# Patient Record
Sex: FEMALE | Race: WHITE | NOT HISPANIC OR LATINO | Employment: FULL TIME | URBAN - METROPOLITAN AREA
[De-identification: names, ages, dates, MRNs, and addresses within clinical notes are randomized per-mention and may not be internally consistent; named-entity substitution may affect disease eponyms.]

---

## 2024-07-29 ENCOUNTER — TELEPHONE (OUTPATIENT)
Dept: OBSTETRICS AND GYNECOLOGY | Facility: CLINIC | Age: 64
End: 2024-07-29
Payer: COMMERCIAL

## 2024-07-29 DIAGNOSIS — Z12.31 ENCOUNTER FOR SCREENING MAMMOGRAM FOR MALIGNANT NEOPLASM OF BREAST: Primary | ICD-10-CM

## 2024-07-29 DIAGNOSIS — N95.1 SYMPTOMATIC MENOPAUSAL OR FEMALE CLIMACTERIC STATES: ICD-10-CM

## 2024-07-29 NOTE — TELEPHONE ENCOUNTER
Caller: Libra ESPINOSA    Relationship: Self    Best call back number: [phone]    What orders are you requesting (i.e. lab or imaging): IMAGING- YEARLY MAMMOGRAM AND DEXA SCAN    In what timeframe would the patient need to come in: PT IS SCHEDULED AS NEW GYN ON 12/2/2024    Where will you receive your lab/imaging services: - [facility]    Additional notes: PT IS SCHEDULED FOR NEW GYN APPT 12/2/2024. ASKING TO SCHEDULE FOR YEARLY MAMMOGRAM AND DEXA SCAN

## 2024-07-29 NOTE — TELEPHONE ENCOUNTER
Orders Placed This Encounter   Procedures    Mammo Screening Digital Tomosynthesis Bilateral With CAD     Standing Status:   Future     Standing Expiration Date:   7/29/2025     Order Specific Question:   Reason for Exam:     Answer:   due for screening mammogram     Order Specific Question:   Release to patient     Answer:   Routine Release [[id number]]     Screening mammogram ordered.  Is there a reason she is getting bone density scans done prior to 65?    Thanks, Toña Hamilton MD

## 2024-07-29 NOTE — TELEPHONE ENCOUNTER
Called and spoke with patient and informed her of both orders being placed.  She also will call her external facility ([facility]) and request for those records to be faxed over - fax number provided.

## 2024-07-29 NOTE — TELEPHONE ENCOUNTER
Called and spoke with patient.  She has been informed of mammogram order being placed.  Patient states that she had previously been followed for dexa scans because she has had two in the past that have shown osteopenia.  She states her last one was ~ 3 years ago.

## 2024-07-29 NOTE — TELEPHONE ENCOUNTER
Orders Placed This Encounter   Procedures    Mammo Screening Digital Tomosynthesis Bilateral With CAD     Standing Status:   Future     Standing Expiration Date:   7/29/2025     Order Specific Question:   Reason for Exam:     Answer:   due for screening mammogram     Order Specific Question:   Release to patient     Answer:   Routine Release [[id number]]    DEXA Bone Density Axial     Standing Status:   Future     Standing Expiration Date:   7/29/2025     Order Specific Question:   Reason for Exam:     Answer:   Post-menopausal     Order Specific Question:   Release to patient     Answer:   Routine Release [[id number]]     Order Specific Question:   Is patient taking or have taken long term Glucocorticoid (steroids)?     Answer:   No     Order Specific Question:   Does the patient have rheumatoid arthritis?     Answer:   No     Order Specific Question:   Does the patient have secondary osteoporosis?     Answer:   No     DEXA ordered.   Is there a way to get the records from the prior DEXA/bone density exams?    Thanks, Toña Hamilton MD

## 2024-08-08 PROBLEM — Z01.419 WELL WOMAN EXAM: Status: ACTIVE | Noted: 2024-08-08

## 2024-10-01 ENCOUNTER — LAB REQUISITION (OUTPATIENT)
Dept: LAB | Facility: HOSPITAL | Age: 64
End: 2024-10-01
Payer: COMMERCIAL

## 2024-10-01 DIAGNOSIS — D17.1 BENIGN LIPOMATOUS NEOPLASM OF SKIN AND SUBCUTANEOUS TISSUE OF TRUNK: ICD-10-CM

## 2024-10-01 PROCEDURE — 88304 TISSUE EXAM BY PATHOLOGIST: CPT | Performed by: SURGERY

## 2024-10-02 LAB
CYTO UR: NORMAL
LAB AP CASE REPORT: NORMAL
LAB AP CLINICAL INFORMATION: NORMAL
PATH REPORT.FINAL DX SPEC: NORMAL
PATH REPORT.GROSS SPEC: NORMAL

## 2024-10-08 LAB
NCCN CRITERIA FLAG: ABNORMAL
TYRER CUZICK SCORE: 13.1

## 2024-10-16 ENCOUNTER — OFFICE VISIT (OUTPATIENT)
Dept: OBSTETRICS AND GYNECOLOGY | Facility: CLINIC | Age: 64
End: 2024-10-16
Payer: COMMERCIAL

## 2024-10-16 VITALS — DIASTOLIC BLOOD PRESSURE: 80 MMHG | RESPIRATION RATE: 14 BRPM | WEIGHT: 130 LBS | SYSTOLIC BLOOD PRESSURE: 128 MMHG

## 2024-10-16 DIAGNOSIS — Z90.722 STATUS POST BILATERAL OOPHORECTOMY: ICD-10-CM

## 2024-10-16 DIAGNOSIS — Z01.419 ENCOUNTER FOR WELL WOMAN EXAM WITH ROUTINE GYNECOLOGICAL EXAM: Primary | ICD-10-CM

## 2024-10-16 DIAGNOSIS — Z80.3 FAMILY HISTORY OF BREAST CANCER: ICD-10-CM

## 2024-10-16 DIAGNOSIS — N39.3 SUI (STRESS URINARY INCONTINENCE, FEMALE): ICD-10-CM

## 2024-10-16 RX ORDER — UBIDECARENONE 100 MG
100 CAPSULE ORAL DAILY
COMMUNITY

## 2024-10-16 RX ORDER — DIPHENOXYLATE HYDROCHLORIDE AND ATROPINE SULFATE 2.5; .025 MG/1; MG/1
TABLET ORAL DAILY
COMMUNITY

## 2024-10-16 NOTE — PROGRESS NOTES
"Subjective   Chief Complaint   Patient presents with    Gynecologic Exam     Libra Warren is a 64 y.o. year old  menopausal female presenting to be seen for her annual exam.  This past year she has not been on hormone replacement therapy.  There has not been vaginal bleeding in the last 12 months.  Menopausal symptoms are present (hot flashes) but not affecting her quality of life .  Menopause was around 49 years of age.   She took a \"natural\" hormone medicine around .    SEXUAL Hx:  She is currently sexually active.  In the past year there there has been NO new sexual partners.    Condoms are never used.  She would not like to be screened for STD's at today's exam.  Larke is painful: no  HEALTH Hx:  She exercises regularly: yes - pilates, walking (2.5 -3 miles).  She wears her seat belt: yes.  She has concerns about domestic violence: no.  OTHER THINGS SHE WANTS TO DISCUSS TODAY:  Nothing else    The following portions of the patient's history were reviewed and updated as appropriate:problem list, current medications, allergies, past family history, past medical history, past social history, and past surgical history.    Social History    Tobacco Use      Smoking status: Never      Smokeless tobacco: Never      Review of Systems  Constitutional POS: nothing reported    NEG: anorexia or night sweats   Genitourinary POS: EDEL is present but it IS NOT effecting her ADL's    NEG: dysuria or hematuria      Gastointestinal POS: nothing reported    NEG: bloating, change in bowel habits, melena, or reflux symptoms   Integument POS: nothing reported    NEG: moles that are changing in size, shape, color or rashes   Breast POS: nothing reported    NEG: persistent breast lump, skin dimpling, or nipple discharge        Objective   /80   Resp 14   Wt 59 kg (130 lb)     General:  well developed; well nourished  no acute distress  mentation appropriate   Skin:  No suspicious lesions seen   Thyroid: " normal to inspection and palpation   Breasts:  Examined in supine position  Symmetric without masses or skin dimpling  Nipples normal without inversion, lesions or discharge  There are no palpable axillary nodes   Abdomen: soft, non-tender; no masses  no umbilical or inguinal hernias are present  no hepato-splenomegaly   Pelvis: Clinical staff was present for exam  External genitalia:  normal appearance of the external genitalia including Bartholin's and Oakton's glands.  :  urethral meatus normal;  Vaginal:  atrophic mucosal changes are present;  Cervix:  normal appearance.  Uterus:  normal size, shape and consistency.  Adnexa:  normal bimanual exam of the adnexa.  Rectal:  digital rectal exam not performed; anus visually normal appearing.        Assessment   Normal GYN exam in menopause  S/p bilateral oophorectomy  EDEL not affecting ADLs  She is up to date on all relevant gynecologic and colorectal screenings except PAP test  Menopausal female currently not on HRT - without significant symptoms affecting activities of daily living  Family history of breast cancer      Plan   Pap was done today.  If she does not receive the results of the Pap within 2 weeks  time, she was instructed to call to find out the results.  I explained to Libra that the recommendations for Pap smear interval in a low risk patient's has lengthened to 3 years time.  I encouraged her to be seen yearly for a full physical exam including breast and pelvic exam even during the off years when PAP's will not be performed.  She was encouraged to get yearly mammograms.  She should report any palpable breast lump(s) or skin changes regardless of mammographic findings.  I explained to Libra that notification regarding her mammogram results will come from the center performing the study.  Our office will not be routinely calling with mammogram results.  It is her responsibility to make sure that the results from the mammogram are communicated to her  by the breast center.  If she has any questions about the results, she is welcome to call our office anytime. Mammogram is scheduled.   DEXA is scheduled  The following data needs to be obtained to update her medical records: last colonoscopy and pathology reports.  Reviewed Kegel exercises in regards to stress urinary incontinence.  Reviewed nonhormonal options for symptomatic hot flashes if she desires to pursue including Veozah and SSRIs.  We also reviewed the utility of Neurontin for night sweats.  Reviewed in regards to her primary care lab results including low estradiol testosterone that we expect those values to be low in menopause and we would not treat to bring those numbers up but would treat symptoms overall.  Patient verbalized understanding.  Will have the front staff scanned the send upon leaving the office.  Continue current exercise regimen.   Amb referral to genetics   The importance of keeping all planned follow-up and taking all medications as prescribed was emphasized.  Follow up for annual exam in ~ one year    No orders of the defined types were placed in this encounter.       Orders Placed This Encounter   Procedures    Ambulatory Referral to Genetic Counseling/Testing     Referral Priority:   Routine     Referral Type:   Consultation     Referral Reason:   Specialty Services Required     Number of Visits Requested:   1       This note was electronically signed.    Toña Hamilton MD  October 16, 2024

## 2024-10-17 LAB — REF LAB TEST METHOD: NORMAL

## 2024-10-23 ENCOUNTER — HOSPITAL ENCOUNTER (OUTPATIENT)
Dept: MAMMOGRAPHY | Facility: HOSPITAL | Age: 64
Discharge: HOME | End: 2024-10-23
Payer: COMMERCIAL

## 2024-10-23 ENCOUNTER — HOSPITAL ENCOUNTER (OUTPATIENT)
Dept: BONE DENSITY | Facility: HOSPITAL | Age: 64
Discharge: HOME | End: 2024-10-23
Payer: COMMERCIAL

## 2024-10-23 DIAGNOSIS — Z12.31 ENCOUNTER FOR SCREENING MAMMOGRAM FOR MALIGNANT NEOPLASM OF BREAST: ICD-10-CM

## 2024-10-23 DIAGNOSIS — N95.1 SYMPTOMATIC MENOPAUSAL OR FEMALE CLIMACTERIC STATES: ICD-10-CM

## 2024-10-23 PROCEDURE — 77063 BREAST TOMOSYNTHESIS BI: CPT

## 2024-10-23 PROCEDURE — 77067 SCR MAMMO BI INCL CAD: CPT

## 2024-10-23 PROCEDURE — 77080 DXA BONE DENSITY AXIAL: CPT

## 2024-11-01 DIAGNOSIS — M81.0 SENILE OSTEOPOROSIS: Primary | ICD-10-CM

## 2025-02-18 ENCOUNTER — OFFICE VISIT (OUTPATIENT)
Dept: ENDOCRINOLOGY | Facility: CLINIC | Age: 65
End: 2025-02-18
Payer: COMMERCIAL

## 2025-02-18 VITALS
WEIGHT: 133 LBS | HEIGHT: 64 IN | OXYGEN SATURATION: 97 % | HEART RATE: 74 BPM | DIASTOLIC BLOOD PRESSURE: 82 MMHG | SYSTOLIC BLOOD PRESSURE: 130 MMHG | BODY MASS INDEX: 22.71 KG/M2

## 2025-02-18 DIAGNOSIS — M81.6 LOCALIZED OSTEOPOROSIS WITHOUT CURRENT PATHOLOGICAL FRACTURE: ICD-10-CM

## 2025-02-18 DIAGNOSIS — Z78.0 ASYMPTOMATIC POSTMENOPAUSAL STATE: Primary | ICD-10-CM

## 2025-02-18 PROCEDURE — 99203 OFFICE O/P NEW LOW 30 MIN: CPT | Performed by: INTERNAL MEDICINE

## 2025-02-18 RX ORDER — RISEDRONATE SODIUM 150 MG/1
150 TABLET, FILM COATED ORAL
Qty: 1 TABLET | Refills: 11 | Status: SHIPPED | OUTPATIENT
Start: 2025-02-18

## 2025-02-18 NOTE — PROGRESS NOTES
Libra Anthony [facility] 64 y.o.  CC: New Patient referred for evaluation and management of Osteoporosis     Tolowa Dee-ni': New Patient referred for evaluation and management of Osteoporosis     Has had prior bone density 3/10/21 with osteoporosis at the spine and left femoral neck   Repeat bone density 10/23/24 with stable osteoporosis at spine and femoral neck  Mother with osteoporosis taking medication   She does take vitamin D and calcium (rda) daily   She walks and does pilates 5-6 days a week   Recent lab work via Dr Loera reviewed - normal GFR noted  No prior fracture   No dysphagia or problems with GERD, heartburn    Allergies   Allergen Reactions    Prochlorperazine Unknown - Low Severity       Current Outpatient Medications:     coenzyme Q10 100 MG capsule, Take 1 capsule by mouth Daily., Disp: , Rfl:     multivitamin tablet tablet, Take  by mouth Daily., Disp: , Rfl:     vitamin D3 125 MCG (5000 UT) capsule capsule, Take 1 capsule by mouth Daily., Disp: , Rfl:     Zinc Acetate, Oral, (ZINC ACETATE PO), Take  by mouth., Disp: , Rfl:     risedronate (ACTONEL) 150 MG tablet, Take 1 tablet by mouth Every 30 (Thirty) Days. with water on empty stomach, nothing by mouth or lie down for next 30 minutes., Disp: 1 tablet, Rfl: 11  Patient Active Problem List    Diagnosis     Asymptomatic postmenopausal state [Z78.0]     Localized osteoporosis without current pathological fracture [M81.6]     Status post bilateral oophorectomy [Z90.722]     EDEL (stress urinary incontinence, female) [N39.3]     Well woman exam [Z01.419]      Review of Systems   Constitutional:  Negative for activity change, appetite change and unexpected weight change.   HENT:  Positive for tinnitus. Negative for congestion and rhinorrhea.    Eyes:  Negative for visual disturbance.   Respiratory:  Negative for cough and shortness of breath.    Cardiovascular:  Negative for palpitations and leg swelling.   Gastrointestinal:  Negative for constipation, diarrhea and  "nausea.   Genitourinary:  Negative for hematuria.   Musculoskeletal:  Negative for arthralgias, back pain, gait problem, joint swelling and myalgias.   Skin:  Negative for color change, rash and wound.   Allergic/Immunologic: Positive for environmental allergies and food allergies. Negative for immunocompromised state.   Neurological:  Negative for dizziness, weakness and light-headedness.   Psychiatric/Behavioral:  Negative for confusion, decreased concentration, dysphoric mood and sleep disturbance. The patient is not nervous/anxious.      Social History     Socioeconomic History    Marital status:    Tobacco Use    Smoking status: Never    Smokeless tobacco: Never   Substance and Sexual Activity    Alcohol use: Yes     Comment: socially    Drug use: Never    Sexual activity: Yes     Partners: Male     Family History   Problem Relation Age of Onset    Breast cancer Sister 49        Neg genetic testing/BRCA neg/DCIS    Breast cancer Maternal Grandmother         passed away at 74, recurrence to bone    Colon cancer Neg Hx     Ovarian cancer Neg Hx     Pancreatic cancer Neg Hx     Prostate cancer Neg Hx      /82   Pulse 74   Ht 162.6 cm (64\")   Wt 60.3 kg (133 lb)   SpO2 97%   BMI 22.83 kg/m²   Physical Exam  Vitals and nursing note reviewed.   Constitutional:       Appearance: Normal appearance. She is well-developed.   HENT:      Head: Normocephalic and atraumatic.   Eyes:      General: Lids are normal.      Extraocular Movements: Extraocular movements intact.      Conjunctiva/sclera: Conjunctivae normal.      Pupils: Pupils are equal, round, and reactive to light.   Neck:      Thyroid: No thyroid mass or thyromegaly.      Vascular: No carotid bruit.      Trachea: Trachea normal. No tracheal deviation.   Cardiovascular:      Rate and Rhythm: Normal rate and regular rhythm.      Pulses: Normal pulses.      Heart sounds: Normal heart sounds. No murmur heard.     No friction rub. No gallop. "   Pulmonary:      Effort: Pulmonary effort is normal. No respiratory distress.      Breath sounds: Normal breath sounds. No wheezing.   Musculoskeletal:         General: No deformity. Normal range of motion.      Cervical back: Normal range of motion and neck supple.   Lymphadenopathy:      Cervical: No cervical adenopathy.   Skin:     General: Skin is warm and dry.      Findings: No erythema or rash.      Nails: There is no clubbing.   Neurological:      Mental Status: She is alert and oriented to person, place, and time.      Cranial Nerves: No cranial nerve deficit.      Deep Tendon Reflexes: Reflexes are normal and symmetric. Reflexes normal.   Psychiatric:         Speech: Speech normal.         Behavior: Behavior normal.         Thought Content: Thought content normal.         Judgment: Judgment normal.       Results for orders placed or performed in visit on 10/16/24   LIQUID-BASED PAP SMEAR WITH HPV GENOTYPING REGARDLESS OF INTERPRETATION (BRYAN,COR,MAD)    Collection Time: 10/16/24  9:56 AM    Specimen: Cervix; ThinPrep Vial   Result Value Ref Range    Reference Lab Report       [facility]  [address] [place] [address]  Phone: [phone] or [phone]  Fax: [phone]  Jose Arellano M.D., Medical Director    PATIENT NAME                                     LABORATORY NO.  SERGIO BONDS.                             [id number]  [id number]                                 AGE                    SEX   N              CLIENT REF #  [facility]                64        1960      F     [id number]      [id number]    ANA LUX MD                            REQUESTING M.D.           ATTENDING M.D.         COPY TO.  [address] [facility], [facility] [address]          [place] [address]                        DATE COLLECTED            DATE RECEIVED          DATE REPORTED  10/16/2024                10/16/2024              10/17/2024    ThinPrep Pap with [facility]    DIAGNOSIS:  Negative for intraepithelial lesion or malignancy    Multiple factors can influence accuracy of Pap  tests; therefore, screening at  regular intervals is necessary for early cancer detection.    COMMENT:     Benign cellular changes associated with atrophy are present.    SPECIMEN ADEQUACY:  SATISFACTORY FOR EVALUATION  Transformation zone is present.  Sparse cellularity, minimal number of squamous cells available for evaluation.  SOURCE OF SPECIMEN:       CERVICAL/ENDOCERVICAL  SLIDES:  1  CLINICAL HISTORY:  Encounter for well woman exam with routine gynecological exam  Stress urinary incontinence, female  Post menopausal    HPV  HR-HPV POOL: Negative    The Aptima HPV assay is an in vitro nucleic acid amplification test for the  qualitative detection of E6/E7 viral messenger RNA from 14 high risk types of  HPV in cervical specimens. The high risk HPV types detected include: 16, 18,  31, 33, 35, 39, 45, 51, 52, 56, 58, 59, 66, 68    CYTOTECHNOLOGIST:             CATHERINE BEASLEY (ASCP)    CPT CODES:  51669, 49198       Diagnoses and all orders for this visit:    1. Asymptomatic postmenopausal state (Primary)  Assessment & Plan:  Trial risendronate- 150 mg monthly and continue calcium and vitamin D    Orders:  -     DEXA Bone Density Axial    2. Localized osteoporosis without current pathological fracture  Assessment & Plan:  Pros and cons of treatment along with review of bone density   Meets criteria for treatment and we discussed options  Will start monthly risendronate 150 mg monthly   Continue calcium and vitamin D   Continue weight bearing exercise   Update dexa 10/25      Other orders  -     risedronate (ACTONEL) 150 MG tablet; Take 1 tablet by mouth Every 30 (Thirty) Days. with water on empty stomach, nothing by mouth or lie down for next 30 minutes.  Dispense: 1 tablet; Refill: 11    Return in about 9 months (around 11/18/2025) for  Recheck.    Electronically signed by: Justa Marques MD

## 2025-02-18 NOTE — ASSESSMENT & PLAN NOTE
Pros and cons of treatment along with review of bone density   Meets criteria for treatment and we discussed options  Will start monthly risendronate 150 mg monthly   Continue calcium and vitamin D   Continue weight bearing exercise   Update dexa 10/25

## 2025-02-24 ENCOUNTER — TRANSCRIBE ORDERS (OUTPATIENT)
Dept: OBSTETRICS AND GYNECOLOGY | Facility: CLINIC | Age: 65
End: 2025-02-24
Payer: COMMERCIAL

## 2025-02-24 DIAGNOSIS — Z12.31 ENCOUNTER FOR SCREENING MAMMOGRAM FOR BREAST CANCER: Primary | ICD-10-CM
